# Patient Record
Sex: MALE | Race: WHITE | NOT HISPANIC OR LATINO | Employment: FULL TIME
[De-identification: names, ages, dates, MRNs, and addresses within clinical notes are randomized per-mention and may not be internally consistent; named-entity substitution may affect disease eponyms.]

---

## 2018-09-26 ENCOUNTER — RECORDS - HEALTHEAST (OUTPATIENT)
Dept: ADMINISTRATIVE | Facility: OTHER | Age: 58
End: 2018-09-26

## 2018-10-02 ENCOUNTER — HOSPITAL ENCOUNTER (OUTPATIENT)
Dept: CT IMAGING | Facility: CLINIC | Age: 58
Discharge: HOME | End: 2018-10-02

## 2018-10-02 ENCOUNTER — COMMUNICATION - HEALTHEAST (OUTPATIENT)
Dept: TELEHEALTH | Facility: CLINIC | Age: 58
End: 2018-10-02

## 2018-10-02 DIAGNOSIS — R91.8 PULMONARY NODULES: ICD-10-CM

## 2019-09-17 ENCOUNTER — RECORDS - HEALTHEAST (OUTPATIENT)
Dept: ADMINISTRATIVE | Facility: OTHER | Age: 59
End: 2019-09-17

## 2019-09-25 ENCOUNTER — HOSPITAL ENCOUNTER (OUTPATIENT)
Dept: CT IMAGING | Facility: CLINIC | Age: 59
Discharge: HOME | End: 2019-09-25

## 2019-09-25 DIAGNOSIS — R91.8 PULMONARY NODULES: ICD-10-CM

## 2020-04-17 ENCOUNTER — APPOINTMENT (OUTPATIENT)
Dept: ULTRASOUND IMAGING | Facility: CLINIC | Age: 60
End: 2020-04-17
Attending: NURSE PRACTITIONER
Payer: COMMERCIAL

## 2020-04-17 ENCOUNTER — HOSPITAL ENCOUNTER (EMERGENCY)
Facility: CLINIC | Age: 60
Discharge: HOME | End: 2020-04-17
Attending: NURSE PRACTITIONER | Admitting: NURSE PRACTITIONER
Payer: COMMERCIAL

## 2020-04-17 VITALS
DIASTOLIC BLOOD PRESSURE: 87 MMHG | OXYGEN SATURATION: 95 % | RESPIRATION RATE: 18 BRPM | BODY MASS INDEX: 42.83 KG/M2 | WEIGHT: 290 LBS | SYSTOLIC BLOOD PRESSURE: 137 MMHG | TEMPERATURE: 98.4 F

## 2020-04-17 DIAGNOSIS — M71.21 BAKER'S CYST OF KNEE, RIGHT: ICD-10-CM

## 2020-04-17 PROBLEM — R07.9 CHEST PAIN, UNSPECIFIED TYPE: Status: ACTIVE | Noted: 2020-04-17

## 2020-04-17 PROBLEM — R91.8 PULMONARY NODULES: Status: ACTIVE | Noted: 2018-03-26

## 2020-04-17 PROBLEM — M17.0 PRIMARY OSTEOARTHRITIS OF BOTH KNEES: Status: ACTIVE | Noted: 2019-09-26

## 2020-04-17 PROBLEM — M17.11 PRIMARY OSTEOARTHRITIS OF RIGHT KNEE: Status: ACTIVE | Noted: 2020-02-28

## 2020-04-17 PROBLEM — Z47.89 AFTERCARE FOLLOWING SURGERY OF THE MUSCULOSKELETAL SYSTEM: Status: ACTIVE | Noted: 2020-02-12

## 2020-04-17 PROCEDURE — 99284 EMERGENCY DEPT VISIT MOD MDM: CPT | Mod: 25

## 2020-04-17 PROCEDURE — 99283 EMERGENCY DEPT VISIT LOW MDM: CPT | Mod: Z6 | Performed by: NURSE PRACTITIONER

## 2020-04-17 PROCEDURE — 25000132 ZZH RX MED GY IP 250 OP 250 PS 637: Performed by: NURSE PRACTITIONER

## 2020-04-17 PROCEDURE — 93971 EXTREMITY STUDY: CPT | Mod: RT

## 2020-04-17 RX ORDER — LISINOPRIL 10 MG/1
10 TABLET ORAL DAILY
COMMUNITY
Start: 2019-09-17

## 2020-04-17 RX ORDER — TRAMADOL HYDROCHLORIDE 50 MG/1
50 TABLET ORAL EVERY 6 HOURS PRN
COMMUNITY
Start: 2020-03-20

## 2020-04-17 RX ORDER — MELOXICAM 15 MG/1
15 TABLET ORAL DAILY
Qty: 30 TABLET | Refills: 0 | Status: SHIPPED | OUTPATIENT
Start: 2020-04-17

## 2020-04-17 RX ORDER — SUMATRIPTAN SUCCINATE 6 MG/.5ML
6 INJECTION, SOLUTION SUBCUTANEOUS PRN
COMMUNITY
Start: 2019-09-17

## 2020-04-17 RX ORDER — SUMATRIPTAN 100 MG/1
100 TABLET, FILM COATED ORAL 2 TIMES DAILY PRN
COMMUNITY
Start: 2019-12-02

## 2020-04-17 RX ORDER — ALBUTEROL SULFATE 90 UG/1
1-2 AEROSOL, METERED RESPIRATORY (INHALATION) EVERY 6 HOURS PRN
COMMUNITY
Start: 2019-08-29

## 2020-04-17 RX ORDER — MELOXICAM 15 MG/1
15 TABLET ORAL DAILY
Qty: 30 TABLET | Refills: 0 | Status: SHIPPED | OUTPATIENT
Start: 2020-04-17 | End: 2020-04-17

## 2020-04-17 RX ADMIN — IBUPROFEN 600 MG: 400 TABLET ORAL at 09:46

## 2020-04-17 NOTE — DISCHARGE INSTRUCTIONS
Start meloxicam and take once daily as needed for pain management.  You may continue to take Tylenol as needed for pain management.  Follow-up with orthopedics for ongoing care and management.

## 2020-04-17 NOTE — ED PROVIDER NOTES
History     Chief Complaint   Patient presents with     Knee Pain     behind R knee lump     HPI  Rodrigo Wesley is a 59 year old male with past medical history of osteoarthritis, chronic back pain, hyperlipidemia, hypertension, GERD who presents to the emergency room with acute onset of right knee pain.  Patient reports he woke up this morning with pain behind his right knee and associative palpable lump without redness, fever, aches, chills, sweats.  Patient denies any recent trauma.  Patient does admit that he had a right knee replacement scheduled on the first week of April and this was canceled due to the current pandemic.    Patient has been taking Tylenol for pain management and he does have a history of GERD and therefore does not take ibuprofen but does not have an allergy and in the past it appears he has been on meloxicam which patient states he has no problems taking either.    Patient denies any history of bleeding or clotting disorders.  Patient denies any history of recent immobilization or surgeries or prolonged car rides or flights.  Patient does state that he works at home but reports that he gets up and moves around and ambulates frequently.    Patient denies fever, aches, chills, sweats, ear pain, eye pain, throat pain, cough, wheezing, shortness of breath, abdominal pain, nausea, vomiting, diarrhea, dysuria, speech difficulty, mental confusion, thoughts of harming self.  Patient reports feeling well otherwise    Allergies:  No Known Allergies    Problem List:    Patient Active Problem List    Diagnosis Date Noted     Chest pain, unspecified type 04/17/2020     Priority: Medium     Primary osteoarthritis of right knee 02/28/2020     Priority: Medium     Aftercare following surgery of the musculoskeletal system 02/12/2020     Priority: Medium     Primary osteoarthritis of both knees 09/26/2019     Priority: Medium     Pulmonary nodules 03/26/2018     Priority: Medium     8x10 mm RLL, 4 mm LLL  nodules on chest CT at [facility] 3/20/18.  Stable on CT 10/2/18 and 19.  Repeat CT in 12 months.       Essential hypertension 2016     Priority: Medium     Morbid obesity with BMI of 50.0-59.9, adult (H) 2016     Priority: Medium     Erectile dysfunction 2012     Priority: Medium     Diverticulosis of colon 2012     Priority: Medium     MARICEL (obstructive sleep apnea) 2012     Priority: Medium     C-PAP USE       Esophageal reflux 2006     Priority: Medium     Other hyperlipidemia 2006     Priority: Medium        Past Medical History:    Past Medical History:   Diagnosis Date     Arthritis      Sleep apnea        Past Surgical History:    Past Surgical History:   Procedure Laterality Date     ARTHROSCOPY KNEE      left     ARTHROSCOPY SHOULDER ROTATOR CUFF REPAIR  2011    Procedure:ARTHROSCOPY SHOULDER ROTATOR CUFF REPAIR; Right Shoulder Arthroscopic Subacromial Decompression,Rotator Cuff Repair--Anesth.Gen.,Scalene Block; Surgeon:JV WALLACE; Location:[facility]     ARTHROSCOPY SHOULDER ROTATOR CUFF REPAIR  2013    Procedure: ARTHROSCOPY SHOULDER ROTATOR CUFF REPAIR;  Left Shoulder Arthroscopic Subacromial Decompression,Rotator Cuff Repair,Distal Clavicle Excision and  Labral Debridement.;  Surgeon: Jv Wallace MD;  Location: [facility]     COLONOSCOPY  2010    COLONOSCOPY performed by ROSS SOLOMON at [facility]     FRACTURE SURGERY      left elbow     ORTHOPEDIC SURGERY      left bicep       Family History:    History reviewed. No pertinent family history.    Social History:  Marital Status:   [2]  Social History     Tobacco Use     Smoking status: Former Smoker     Types: Cigarettes     Last attempt to quit: 12/3/2000     Years since quittin.3     Smokeless tobacco: Never Used   Substance Use Topics     Alcohol use: Yes     Alcohol/week: 14.0 - 21.0 standard drinks     Types: 14 - 21 Cans of beer per week     Drug use: No         Medications:    albuterol (PROAIR HFA/PROVENTIL HFA/VENTOLIN HFA) 108 (90 Base) MCG/ACT inhaler  lisinopril (ZESTRIL) 10 MG tablet  meloxicam (MOBIC) 15 MG tablet  SUMAtriptan (IMITREX) 100 MG tablet  SUMAtriptan Succinate Refill (IMITREX) 6 MG/0.5ML SOCT  tiZANidine (ZANAFLEX) 4 MG tablet  traMADol (ULTRAM) 50 MG tablet  ASPIRIN 325 MG PO TABS  ATORVASTATIN CALCIUM PO  HYDROcodone-acetaminophen (NORCO)  MG per tablet  OMEPRAZOLE 20 MG PO TBEC  oxyCODONE-acetaminophen (PERCOCET) 5-325 MG per tablet      Review of Systems  As mentioned above in the history present illness. All other systems were reviewed and are negative.    Physical Exam   BP: 137/87  Heart Rate: 99  Temp: 98.4  F (36.9  C)  Resp: 18  Weight: 131.5 kg (290 lb)  SpO2: 95 %      Physical Exam  Vitals signs and nursing note reviewed.   Constitutional:       General: He is not in acute distress.     Appearance: He is well-developed. He is obese. He is not ill-appearing, toxic-appearing or diaphoretic.   HENT:      Head: Normocephalic and atraumatic.      Right Ear: External ear normal.      Left Ear: External ear normal.      Nose: Nose normal.   Eyes:      General:         Right eye: No discharge.         Left eye: No discharge.      Conjunctiva/sclera: Conjunctivae normal.   Cardiovascular:      Rate and Rhythm: Normal rate and regular rhythm.      Heart sounds: Normal heart sounds. No murmur. No friction rub. No gallop.    Pulmonary:      Effort: Pulmonary effort is normal.      Breath sounds: Normal breath sounds. No stridor. No wheezing.   Abdominal:      Tenderness: There is no abdominal tenderness.   Musculoskeletal: Normal range of motion.         General: No swelling, tenderness, deformity or signs of injury.      Right knee: He exhibits swelling (popliteal fossa with palpable 4 cm oval firm mass). He exhibits normal range of motion, no effusion, no ecchymosis, no deformity, no laceration and no erythema.      Right lower leg: No  edema.      Left lower leg: No edema.      Comments: Pedal pulses +2 bilaterally     Skin:     General: Skin is warm.      Findings: No rash.   Neurological:      Mental Status: He is alert and oriented to person, place, and time.   Psychiatric:         Attention and Perception: Attention normal.         Mood and Affect: Mood is anxious (mildly).         ED Course        Procedures    Results for orders placed or performed during the hospital encounter of 04/17/20 (from the past 24 hour(s))   US Lower Extremity Venous Duplex Right    Narrative    ULTRASOUND LOWER EXTREMITY VENOUS DUPLEX RIGHT   4/17/2020 10:24 AM     HISTORY: Woke up with painful right knee and history of Baker's cyst,  recently cancelled right total knee replacement due to pandemic.    COMPARISON: None.    FINDINGS: Gray-scale, color and Doppler spectral analysis ultrasound  was performed of the right lower extremity. Compression and  augmentation imaging was performed.    The deep venous system of the right lower extremity is fully  compressible.  Spectral Doppler demonstrates normal phasicity and  excellent augmentation with calf compression.  Visualized greater  saphenous and deep calf veins are patent.    There is a complex fluid collection in the right popliteal fossa  measuring 10.0 x 4.2 x 5.3 cm, likely representing a complex Baker's  cyst.      Impression    IMPRESSION:  1. No evidence for DVT within the right lower extremity.  2. Large, complex, probable Baker's cyst in the medial right popliteal  fossa.       Medications   ibuprofen (ADVIL/MOTRIN) tablet 600 mg (600 mg Oral Given 4/17/20 0946)       Assessments & Plan (with Medical Decision Making)  Rodrigo Wesley is a 59 year old male with past medical history of osteoarthritis, chronic back pain, hyperlipidemia, hypertension, GERD who presents to the emergency room with acute onset of right knee pain with associative palpable mass behind right knee without any history of bleeding or  clotting disorder or recent trauma or recent immobilization or recent surgery.  Patient does have a history of Baker's cyst.  On examination patient has normal pedal pulses, normal range of motion and sensation with a palpable tender 4 cm mass mass noted betimes in the popliteal fossa of the right knee.  Ordered morning dose on Tuesday ultrasound right venous Doppler of right lower extremity to evaluate for blood clot versus Baker's cyst versus arthritis and reveals a complex Baker's cyst.  Ibuprofen ordered for pain management.  Reviewed these findings with patient.  He feels comfortable discharging to home reports he has had Baker's cyst in the past and understands treatment and etiology.  Recommended follow-up with orthopedic as needed.  Discussed Ace wrap and warm packs as needed.  Patient verbalized understanding and was discharged in stable condition..       I have reviewed the nursing notes.    I have reviewed the findings, diagnosis, plan and need for follow up with the patient.    Discharge Medication List as of 4/17/2020 11:18 AM      START taking these medications    Details   meloxicam (MOBIC) 15 MG tablet Take 1 tablet (15 mg) by mouth daily, Disp-30 tablet,R-0, E-Prescribe             Final diagnoses:   Baker's cyst of knee, right       4/17/2020   [facility] EMERGENCY DEPARTMENT     China Cadena, KWESI CNP  04/17/20 1210

## 2020-04-17 NOTE — ED AVS SNAPSHOT
[facility] Emergency Department  [address] [address]  Phone:  [phone]  Fax:  [phone]                                    Rodrigo Wesley   MRN: [id number]    Department:  [facility] Emergency Department   Date of Visit:  4/17/2020           After Visit Summary Signature Page    I have received my discharge instructions, and my questions have been answered. I have discussed any challenges I see with this plan with the nurse or doctor.    ..........................................................................................................................................  Patient/Patient Representative Signature      ..........................................................................................................................................  Patient Representative Print Name and Relationship to Patient    ..................................................               ................................................  Date                                   Time    ..........................................................................................................................................  Reviewed by Signature/Title    ...................................................              ..............................................  Date                                               Time          22EPIC Rev 08/18

## 2020-04-17 NOTE — ED TRIAGE NOTES
Pt here with lump behind the R knee that he woke up with. Pt states that he had a bakers cyst there before but this came out of nowhere.

## 2021-07-25 ENCOUNTER — HEALTH MAINTENANCE LETTER (OUTPATIENT)
Age: 61
End: 2021-07-25

## 2021-09-19 ENCOUNTER — HEALTH MAINTENANCE LETTER (OUTPATIENT)
Age: 61
End: 2021-09-19

## 2022-07-11 ENCOUNTER — TRANSCRIBE ORDERS (OUTPATIENT)
Dept: OTHER | Age: 62
End: 2022-07-11

## 2022-07-11 DIAGNOSIS — R13.19 OTHER DYSPHAGIA: Primary | ICD-10-CM

## 2022-07-19 ENCOUNTER — HOSPITAL ENCOUNTER (OUTPATIENT)
Dept: SPEECH THERAPY | Facility: CLINIC | Age: 62
Setting detail: THERAPIES SERIES
Discharge: HOME | End: 2022-07-19
Attending: PHYSICIAN ASSISTANT
Payer: COMMERCIAL

## 2022-07-19 ENCOUNTER — HOSPITAL ENCOUNTER (OUTPATIENT)
Dept: RADIOLOGY | Facility: CLINIC | Age: 62
Discharge: HOME | End: 2022-07-19
Attending: PHYSICIAN ASSISTANT
Payer: COMMERCIAL

## 2022-07-19 DIAGNOSIS — R13.19 OTHER DYSPHAGIA: ICD-10-CM

## 2022-07-19 PROCEDURE — 92611 MOTION FLUOROSCOPY/SWALLOW: CPT | Mod: GN

## 2022-07-19 PROCEDURE — 74230 X-RAY XM SWLNG FUNCJ C+: CPT

## 2022-07-19 PROCEDURE — 74220 X-RAY XM ESOPHAGUS 1CNTRST: CPT

## 2022-07-19 PROCEDURE — 255N000001 HC RX 255: Performed by: PHYSICIAN ASSISTANT

## 2022-07-19 RX ADMIN — ANTACID/ANTIFLATULENT 4 G: 380; 550; 10; 10 GRANULE, EFFERVESCENT ORAL at 14:26

## 2022-07-22 NOTE — PROGRESS NOTES
"VIDEOFLUOROSCOPIC SWALLOW STUDY WITH SPEECH PATHOLOGY     07/19/22 1400   General Information   Type Of Visit Initial   Start Of Care Date 07/19/22   Referring Physician Jane Eldridge PA-C  ([facility])   Medical Diagnosis Other dysphagia   Onset Of Illness/injury Or Date Of Surgery 07/11/22  (Order date)   Pertinent History of Current Problem/OT: Additional Occupational Profile Info Patient states, \"I feel like little things get stuck in my throat.\"  He reports that small pills, pieces of bread crust, minced onion, and even crushed ice, catch in his throat and remain there long after he has swallowed/eaten.  Patient states that this has been an issue for quite some time, but became noticeably worse last month (June 2022).  He endorses a history of GERD and reports that he takes omeprazole for this.   Respiratory Status Room air   General Observations Patient pleasant and cooperative. He arrived to appointment unaccompanied.   VFSS Evaluation   VFSS Additional Documentation Yes   VFSS Eval: Radiology   Radiologist Dr. Carrillo   Views Taken left lateral   Physical Location of Procedure [facility]   VFSS Eval: Thin Liquid Texture Trial   Mode of Presentation, Thin Liquid cup;straw;self-fed   Order of Presentation Trials 1, 2, 5  (Trial 5 consisted of consecutive sips taken by straw)   Preparatory Phase WFL   Oral Phase, Thin Liquid WFL   Pharyngeal Phase, Thin Liquid WFL   Rosenbek's Penetration Aspiration Scale: Thin Liquid Trial Results 1 - no aspiration, contrast does not enter airway   Diagnostic Statement Swallow response was generally timely.  Occasional mild delay resulting in pourover past the tip of the epiglottis.  No aspiration or laryngeal penetration observed.  No oropharyngeal stasis after swallows.   VFSS Eval: Puree Solid Texture Trial   Mode of Presentation, Puree spoon;self-fed   Order of Presentation Trials 3, 4   Preparatory Phase WFL   Oral Phase, Puree WFL   Pharyngeal Phase, " Puree WFL   Rosenbek's Penetration Aspiration Scale: Puree Food Trial Results 1 - no aspiration, contrast does not enter airway   Diagnostic Statement Swallow response was timely.  No aspiration or laryngeal penetration observed.  No oropharyngeal stasis after swallows.   Esophageal Phase of Swallow   Patient reports or presents with symptoms of esophageal dysphagia Yes   Esophageal comments Please refer to Radiologist's dedicated esophagram report for details.   Swallow Eval: Clinical Impressions   Skilled Criteria for Therapy Intervention No problems identified which require skilled intervention   Dysphagia Outcome Severity Scale (LETA) Level 7 - LETA   Diet texture recommendations Regular diet;Thin liquids (level 0)   Recommended Feeding/Eating Techniques maintain upright posture during/after eating for 30 mins;small sips/bites;alternate between small bites and sips of food/liquid;other (see comments)  (Eat slowly)   Clinical Impression Comments Videofluoroscopic swallow study completed.  Patient's oropharyngeal swallow function is WNL.  Oral control and bolus transit are WNL. Tongue base retraction is WNL. Swallow response is generally timely.  Epiglottic inversion is timely and complete.  Hyolaryngeal elevation and hyolaryngeal excursion are adequate.  Pharyngeal constriction was adequate.  No aspiration or laryngeal penetration observed.  No oropharyngeal stasis noted after swallows.  Patient's aspiration risk is low.  He is safe to continue current diet of Regular textures and thin liquids.  Recommend that he follow GERD precautions, including sitting fully upright for meals and remaining upright for 30-60 minutes thereafter, eating slowly, alternating bite of food with sip(s) of liquid, and avoiding foods/beverages known to exacerbate reflux symptoms.  He was provided with verbal education and written handouts about GERD precautions.  Anticipate that he would benefit from further reinforcement of this  information by [facility] provider(s).   Total Session Time   SLP Eval: VideoFluoroscopic Swallow function Minutes (60528) 15   Total Evaluation Time 15 minutes

## 2022-08-21 ENCOUNTER — HEALTH MAINTENANCE LETTER (OUTPATIENT)
Age: 62
End: 2022-08-21

## 2022-11-21 ENCOUNTER — HEALTH MAINTENANCE LETTER (OUTPATIENT)
Age: 62
End: 2022-11-21

## 2023-09-16 ENCOUNTER — HEALTH MAINTENANCE LETTER (OUTPATIENT)
Age: 63
End: 2023-09-16

## 2024-01-19 ENCOUNTER — HOSPITAL ENCOUNTER (OUTPATIENT)
Facility: CLINIC | Age: 64
End: 2024-01-19
Attending: INTERNAL MEDICINE | Admitting: INTERNAL MEDICINE
Payer: COMMERCIAL

## 2024-11-09 ENCOUNTER — HEALTH MAINTENANCE LETTER (OUTPATIENT)
Age: 64
End: 2024-11-09